# Patient Record
Sex: MALE | Race: WHITE | NOT HISPANIC OR LATINO
[De-identification: names, ages, dates, MRNs, and addresses within clinical notes are randomized per-mention and may not be internally consistent; named-entity substitution may affect disease eponyms.]

---

## 2017-04-18 ENCOUNTER — APPOINTMENT (OUTPATIENT)
Dept: VASCULAR SURGERY | Facility: CLINIC | Age: 57
End: 2017-04-18

## 2017-09-25 ENCOUNTER — EMERGENCY (EMERGENCY)
Facility: HOSPITAL | Age: 57
LOS: 1 days | Discharge: HOME | End: 2017-09-25
Attending: EMERGENCY MEDICINE | Admitting: EMERGENCY MEDICINE
Payer: COMMERCIAL

## 2017-09-25 VITALS
DIASTOLIC BLOOD PRESSURE: 84 MMHG | TEMPERATURE: 99 F | OXYGEN SATURATION: 99 % | RESPIRATION RATE: 16 BRPM | HEART RATE: 70 BPM | SYSTOLIC BLOOD PRESSURE: 146 MMHG

## 2017-09-25 DIAGNOSIS — Z98.89 OTHER SPECIFIED POSTPROCEDURAL STATES: Chronic | ICD-10-CM

## 2017-09-25 DIAGNOSIS — Z86.79 PERSONAL HISTORY OF OTHER DISEASES OF THE CIRCULATORY SYSTEM: Chronic | ICD-10-CM

## 2017-09-25 DIAGNOSIS — Z98.61 CORONARY ANGIOPLASTY STATUS: Chronic | ICD-10-CM

## 2017-09-25 PROCEDURE — 99285 EMERGENCY DEPT VISIT HI MDM: CPT

## 2017-09-25 NOTE — ED ADULT NURSE NOTE - OBJECTIVE STATEMENT
57 year old male A&OX3 presents with right leg swelling and fevers x several hours. Patient states he noticed leg swelling in the afternoon and was found to have a temperature of 101-102 orally. Patient states that leg swelling occurs occasionally. Patient has PMHX of stent placement x 2. Patient states that his family member gave patient antibiotics to take because patient believes he was bitten by a bug. Patient's lung sounds clear and equal bilaterally. Patient abdomen is distended but non tender to palpation. Patient denies nausea, vomiting, dizziness, weakness, shortness of breath, chest pain.

## 2017-09-26 VITALS
TEMPERATURE: 99 F | HEART RATE: 73 BPM | OXYGEN SATURATION: 99 % | RESPIRATION RATE: 18 BRPM | DIASTOLIC BLOOD PRESSURE: 80 MMHG | SYSTOLIC BLOOD PRESSURE: 136 MMHG

## 2017-09-26 LAB
ANION GAP SERPL CALC-SCNC: 15 MMOL/L — SIGNIFICANT CHANGE UP (ref 5–17)
BASOPHILS # BLD AUTO: 0 K/UL — SIGNIFICANT CHANGE UP (ref 0–0.2)
BASOPHILS NFR BLD AUTO: 0.3 % — SIGNIFICANT CHANGE UP (ref 0–2)
BUN SERPL-MCNC: 19 MG/DL — SIGNIFICANT CHANGE UP (ref 7–23)
CALCIUM SERPL-MCNC: 10 MG/DL — SIGNIFICANT CHANGE UP (ref 8.4–10.5)
CHLORIDE SERPL-SCNC: 99 MMOL/L — SIGNIFICANT CHANGE UP (ref 96–108)
CO2 SERPL-SCNC: 26 MMOL/L — SIGNIFICANT CHANGE UP (ref 22–31)
CREAT SERPL-MCNC: 0.92 MG/DL — SIGNIFICANT CHANGE UP (ref 0.5–1.3)
EOSINOPHIL # BLD AUTO: 0.1 K/UL — SIGNIFICANT CHANGE UP (ref 0–0.5)
EOSINOPHIL NFR BLD AUTO: 1 % — SIGNIFICANT CHANGE UP (ref 0–6)
GLUCOSE SERPL-MCNC: 103 MG/DL — HIGH (ref 70–99)
HCT VFR BLD CALC: 45.2 % — SIGNIFICANT CHANGE UP (ref 39–50)
HGB BLD-MCNC: 15.3 G/DL — SIGNIFICANT CHANGE UP (ref 13–17)
LYMPHOCYTES # BLD AUTO: 1.3 K/UL — SIGNIFICANT CHANGE UP (ref 1–3.3)
LYMPHOCYTES # BLD AUTO: 8.8 % — LOW (ref 13–44)
MCHC RBC-ENTMCNC: 32.9 PG — SIGNIFICANT CHANGE UP (ref 27–34)
MCHC RBC-ENTMCNC: 33.8 GM/DL — SIGNIFICANT CHANGE UP (ref 32–36)
MCV RBC AUTO: 97.5 FL — SIGNIFICANT CHANGE UP (ref 80–100)
MONOCYTES # BLD AUTO: 1.2 K/UL — HIGH (ref 0–0.9)
MONOCYTES NFR BLD AUTO: 8.7 % — SIGNIFICANT CHANGE UP (ref 2–14)
NEUTROPHILS # BLD AUTO: 11.7 K/UL — HIGH (ref 1.8–7.4)
NEUTROPHILS NFR BLD AUTO: 81.1 % — HIGH (ref 43–77)
PLATELET # BLD AUTO: 164 K/UL — SIGNIFICANT CHANGE UP (ref 150–400)
POTASSIUM SERPL-MCNC: 4 MMOL/L — SIGNIFICANT CHANGE UP (ref 3.5–5.3)
POTASSIUM SERPL-SCNC: 4 MMOL/L — SIGNIFICANT CHANGE UP (ref 3.5–5.3)
RBC # BLD: 4.64 M/UL — SIGNIFICANT CHANGE UP (ref 4.2–5.8)
RBC # FLD: 11.9 % — SIGNIFICANT CHANGE UP (ref 10.3–14.5)
SODIUM SERPL-SCNC: 140 MMOL/L — SIGNIFICANT CHANGE UP (ref 135–145)
WBC # BLD: 14.4 K/UL — HIGH (ref 3.8–10.5)
WBC # FLD AUTO: 14.4 K/UL — HIGH (ref 3.8–10.5)

## 2017-09-26 PROCEDURE — 93971 EXTREMITY STUDY: CPT | Mod: 26

## 2017-09-26 PROCEDURE — 87040 BLOOD CULTURE FOR BACTERIA: CPT

## 2017-09-26 PROCEDURE — 93971 EXTREMITY STUDY: CPT

## 2017-09-26 PROCEDURE — 85027 COMPLETE CBC AUTOMATED: CPT

## 2017-09-26 PROCEDURE — 80048 BASIC METABOLIC PNL TOTAL CA: CPT

## 2017-09-26 PROCEDURE — 99284 EMERGENCY DEPT VISIT MOD MDM: CPT | Mod: 25

## 2017-09-26 RX ORDER — CEPHALEXIN 500 MG
1 CAPSULE ORAL
Qty: 28 | Refills: 0
Start: 2017-09-26 | End: 2017-10-03

## 2017-09-26 RX ORDER — CEPHALEXIN 500 MG
500 CAPSULE ORAL ONCE
Refills: 0 | Status: COMPLETED | OUTPATIENT
Start: 2017-09-26 | End: 2017-09-26

## 2017-09-26 RX ADMIN — Medication 500 MILLIGRAM(S): at 02:41

## 2017-09-26 NOTE — ED PROVIDER NOTE - OBJECTIVE STATEMENT
Resident: 57y M PMH CAD s/p stents on aspirin, clopidogrel,, HTN, HLD, AAA s/p repair presents with fever, RLE swelling. This afternoon, patient felt cold, measured temperature Tmax 38. 5. Also noticed RLE swelling, redness. Took one dose of amoxicillin at 6pm from some he had left over. Denies trauma, fall. Hx vascular surgery on right leg.

## 2017-09-26 NOTE — ED PROVIDER NOTE - ATTENDING CONTRIBUTION TO CARE
pt with RLE swelling and redness. chills.   on exam, mild edema and redness.   cellulitis r/o dvt. will get US. labs, if reassuring, pt looks well, will give trial outpt abx tx with instructions to return if not improving..

## 2017-09-26 NOTE — ED PROVIDER NOTE - NS ED ROS FT
Constitutional: +fever, +chills.  Eyes: no visual changes.  ENMT: no sore throat.  CV: no chest pain.  Resp: no cough, no shortness of breath.  GI: no abdominal pain, no nausea, no vomiting, no diarrhea.  : no dysuria, no hematuria.  MSK: no back pain, no neck pain.  Skin: +rashes.  Neuro: no headache, no loss of consciousness, no weakness, no numbness, no tingling.  Psych: no known mental health issues.  Endo: no diabetes, no thyroid trouble.

## 2017-09-26 NOTE — ED PROVIDER NOTE - PROGRESS NOTE DETAILS
Resident: Labs show elevated WBCs, US negative for DVT. Likely cellulitis; will dc with abx and primary care doctor follow-up. -AV

## 2017-09-26 NOTE — ED PROVIDER NOTE - PHYSICAL EXAMINATION
Resident: Gen: well appearing, of stated age, no acute distress; Head: NC, AT; ENT: PERRL, EOMI, MMM, no uvular deviation, no tonsilar erythema; Neck: supple with full ROM; Chest: CTAB, no retractions, rate normal, appears to breathe comfortably; Heart: RRR S1S2 No JVD b/l pulses 2+ in arms and legs; Abd: Soft non-tender, no rebound or guarding; Back: No spinal deformity; Ext: Moving all 4 extremities without obvious impairment to ROM, no obvious weakness; Neuro: fluid speech; Psych: No anxiety, depression or pressured speech noted; Skin: right medial ankle with swelling, erythema, tenderness to palpation, no areas of fluctuence.

## 2017-10-01 LAB
CULTURE RESULTS: SIGNIFICANT CHANGE UP
CULTURE RESULTS: SIGNIFICANT CHANGE UP
SPECIMEN SOURCE: SIGNIFICANT CHANGE UP
SPECIMEN SOURCE: SIGNIFICANT CHANGE UP

## 2017-10-17 ENCOUNTER — APPOINTMENT (OUTPATIENT)
Dept: VASCULAR SURGERY | Facility: CLINIC | Age: 57
End: 2017-10-17
Payer: COMMERCIAL

## 2017-10-17 PROCEDURE — 93978 VASCULAR STUDY: CPT

## 2017-10-17 PROCEDURE — 99213 OFFICE O/P EST LOW 20 MIN: CPT | Mod: 25

## 2017-10-17 RX ORDER — AMLODIPINE BESYLATE 5 MG/1
5 TABLET ORAL
Qty: 30 | Refills: 0 | Status: ACTIVE | COMMUNITY
Start: 2017-04-03

## 2017-10-17 RX ORDER — CLOBETASOL PROPIONATE 0.5 MG/G
0.05 CREAM TOPICAL
Qty: 60 | Refills: 0 | Status: COMPLETED | COMMUNITY
Start: 2017-01-10

## 2017-10-17 RX ORDER — BUPROPION HYDROCHLORIDE 150 MG/1
150 TABLET, FILM COATED, EXTENDED RELEASE ORAL
Qty: 60 | Refills: 0 | Status: COMPLETED | COMMUNITY
Start: 2016-11-02

## 2017-10-17 RX ORDER — LEVOCETIRIZINE DIHYDROCHLORIDE 5 MG/1
5 TABLET ORAL
Qty: 30 | Refills: 0 | Status: COMPLETED | COMMUNITY
Start: 2017-03-01

## 2017-10-17 RX ORDER — CICLOPIROX OLAMINE 7.7 MG/G
0.77 CREAM TOPICAL
Qty: 60 | Refills: 0 | Status: COMPLETED | COMMUNITY
Start: 2017-01-10

## 2017-10-17 RX ORDER — FLUOCINONIDE 0.5 MG/G
0.05 CREAM TOPICAL
Qty: 60 | Refills: 0 | Status: COMPLETED | COMMUNITY
Start: 2017-03-01

## 2017-10-17 RX ORDER — KETOCONAZOLE 20.5 MG/ML
2 SHAMPOO, SUSPENSION TOPICAL
Qty: 120 | Refills: 0 | Status: COMPLETED | COMMUNITY
Start: 2017-03-01

## 2017-10-17 RX ORDER — HYDROXYZINE HYDROCHLORIDE 25 MG/1
25 TABLET ORAL
Qty: 30 | Refills: 0 | Status: COMPLETED | COMMUNITY
Start: 2017-01-10

## 2017-10-17 RX ORDER — AMMONIUM LACTATE 12 %
12 CREAM (GRAM) TOPICAL
Qty: 385 | Refills: 0 | Status: COMPLETED | COMMUNITY
Start: 2017-04-08

## 2017-10-17 RX ORDER — ASPIRIN ENTERIC COATED TABLETS 81 MG 81 MG/1
81 TABLET, DELAYED RELEASE ORAL
Qty: 30 | Refills: 0 | Status: COMPLETED | COMMUNITY
Start: 2016-11-02

## 2018-10-09 ENCOUNTER — APPOINTMENT (OUTPATIENT)
Dept: VASCULAR SURGERY | Facility: CLINIC | Age: 58
End: 2018-10-09

## 2018-10-23 ENCOUNTER — APPOINTMENT (OUTPATIENT)
Dept: VASCULAR SURGERY | Facility: CLINIC | Age: 58
End: 2018-10-23
Payer: COMMERCIAL

## 2018-10-23 PROCEDURE — 93978 VASCULAR STUDY: CPT

## 2018-10-23 PROCEDURE — 99213 OFFICE O/P EST LOW 20 MIN: CPT

## 2019-10-22 ENCOUNTER — APPOINTMENT (OUTPATIENT)
Dept: VASCULAR SURGERY | Facility: CLINIC | Age: 59
End: 2019-10-22
Payer: COMMERCIAL

## 2019-10-22 PROCEDURE — 99213 OFFICE O/P EST LOW 20 MIN: CPT

## 2019-10-22 PROCEDURE — 93978 VASCULAR STUDY: CPT

## 2019-10-29 ENCOUNTER — APPOINTMENT (OUTPATIENT)
Dept: ORTHOPEDIC SURGERY | Facility: CLINIC | Age: 59
End: 2019-10-29

## 2019-10-29 DIAGNOSIS — M25.562 PAIN IN LEFT KNEE: ICD-10-CM

## 2019-10-29 DIAGNOSIS — G89.29 PAIN IN LEFT KNEE: ICD-10-CM

## 2019-10-29 PROBLEM — Z00.00 ENCOUNTER FOR PREVENTIVE HEALTH EXAMINATION: Noted: 2019-10-29

## 2019-10-29 NOTE — PROCEDURE
[FreeTextEntry1] : Aortic duplex reveals widely patent endograft w/o signs of endoleak, and sac measuring 4.9x4.8cm

## 2019-10-29 NOTE — ASSESSMENT
[FreeTextEntry1] : Pt s/p EVAR/AAA 6mos prior w/o symptoms and widely patent endograft on duplex w/o signs of endoleak.  Aneurysm sac alec from pre-op size measurements (4.6x4.9cm-->4.9x4.8cm).  Will reimage patient's endograft next year w/CTA a/p.\par \par I personally discussed this patient with the Physician Assistant at the time of the visit. I agree with the assessment and plan as written

## 2019-10-29 NOTE — PHYSICAL EXAM
[Normal Thyroid] : the thyroid was normal [Normal Breath Sounds] : Normal breath sounds [Normal Rate and Rhythm] : normal rate and rhythm [2+] : left 2+ [No HSM] : no hepatosplenomegaly [No Rash or Lesion] : No rash or lesion [Alert] : alert [Calm] : calm [JVD] : no jugular venous distention  [Ankle Swelling (On Exam)] : not present [Abdomen Masses] : No abdominal masses [Abdomen Tenderness] : ~T ~M No abdominal tenderness [Purpura] : no purpura  [Petechiae] : no petechiae [Skin Ulcer] : no ulcer [Skin Induration] : no induration [de-identified] : Well-nourished, NAD [de-identified] : NC/AT, anicteric [de-identified] : SNTND, +BSx4 [de-identified] : FROM throughout, strength 5x5 x 4

## 2019-10-29 NOTE — HISTORY OF PRESENT ILLNESS
[FreeTextEntry1] : 57yoM w/h/o smoking and AAA s/p EVAR/AAA 6mos prior returning for surveillance duplex.  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

## 2019-11-04 ENCOUNTER — APPOINTMENT (OUTPATIENT)
Dept: ORTHOPEDIC SURGERY | Facility: CLINIC | Age: 59
End: 2019-11-04

## 2019-11-12 ENCOUNTER — APPOINTMENT (OUTPATIENT)
Dept: SPORTS MEDICINE | Facility: CLINIC | Age: 59
End: 2019-11-12
Payer: COMMERCIAL

## 2019-11-12 DIAGNOSIS — E78.00 PURE HYPERCHOLESTEROLEMIA, UNSPECIFIED: ICD-10-CM

## 2019-11-12 DIAGNOSIS — I10 ESSENTIAL (PRIMARY) HYPERTENSION: ICD-10-CM

## 2019-11-12 DIAGNOSIS — I51.9 HEART DISEASE, UNSPECIFIED: ICD-10-CM

## 2019-11-12 DIAGNOSIS — M70.52 OTHER BURSITIS OF KNEE, LEFT KNEE: ICD-10-CM

## 2019-11-12 PROCEDURE — 99204 OFFICE O/P NEW MOD 45 MIN: CPT | Mod: 25

## 2019-11-12 PROCEDURE — 20605 DRAIN/INJ JOINT/BURSA W/O US: CPT

## 2019-11-12 RX ORDER — METOPROLOL SUCCINATE 100 MG/1
TABLET, EXTENDED RELEASE ORAL
Refills: 0 | Status: ACTIVE | COMMUNITY

## 2019-11-12 RX ORDER — OMEPRAZOLE 20 MG/1
20 CAPSULE, DELAYED RELEASE ORAL
Refills: 0 | Status: ACTIVE | COMMUNITY

## 2019-11-12 NOTE — PROCEDURE
[Aspiration] : Aspiration [Left] : of the left [Effusion] : Effusion [Patient] : patient [Prepatellar Bursa] : prepatellar bursa [Benefits] : benefits [Risk] : risk [Verbal Consent Obtained] : verbal consent was obtained prior to the procedure [Alcohol] : Alcohol [Infection] : infection [Bleeding] : bleeding [___mL] : [unfilled] ~UmL of lidocaine [Betadine] : Betadine [18] : an 18-gauge [Lateral] : lateral [Ultrasound Guided] : The procedure was ultrasound guided.   [Bloody] : bloody [Bandage Applied] : a bandage [___ mL Fluid] : [unfilled] mL of [Ace Wrap] : an ace wrap [None] : none [Tolerated Well] : The patient tolerated the procedure well [de-identified] : Attending note. Using aseptic technique. Skin and needle track were anesthetized with 1% plain lidocaine. 48 cc of bloody synovial fluid was aspirated from the prepatellar bursa. Procedure was tolerated well. Ace bandage was applied to the knee.

## 2019-11-12 NOTE — PHYSICAL EXAM
[Normal] : No respiratory distress and lungs were clear to auscultation bilaterally [de-identified] : Patient with patellar swelling. There is no knee joint effusion noted. There is no overlying erythema or tenderness to palpation. There is no joint line tenderness. Vidal's was negative. He has full ROM. Lachman's negative. Anterior drawer and posterior drawer tests were negative. MCL and LCL appear intact with no increased ligamentous laxity appreciated. There is no palpable Baker's cyst. He has a markedly swollen patellar bursa. \par     Attending note. Patient is alert and in no acute distress. Examination of the left lower extremity reveals swelling over the prepatellar bursa. There is no leg edema or calf tenderness. There is no tenderness over the prepatellar bursa. There is no knee effusion. Patient has full range of motion of his knee. He is stable when stressed. There is no joint line tenderness. Skin is normal. Sensation is normal. His pulses are intact. [de-identified] : US of the left knee was performed. There is a markedly enlarged patellar bursa noted. No knee joint effusion appreciated. Quadriceps tendon, Patellar tendon, MCL, LCL are intact. No Baker's cyst.

## 2019-11-12 NOTE — DISCUSSION/SUMMARY
[de-identified] : Patient is a 59 year old presenting with left prepatellar swelling. 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 Aspirate was 50mL and bloody, likely a traumatic bursitis. He tolerated aspiration without issue and knee was wrapped in ACE bandage. Advised patient to keep compression on the knee for the next 3 days and try to avoid kneeling. Will return to the clinic if he notes recurrent swelling. \par    Attending note. Impression: #1 left anterior knee swelling, #2 traumatic prepatellar bursitis of the left knee. Approximately 48 cc of bloody synovial fluid was aspirated from the left knee today. Ace bandage was applied. Patient and family member were advised that the swelling may recur. The patient returns for recurrent prepatellar bursitis would aspirate and consider steroid.

## 2019-11-12 NOTE — HISTORY OF PRESENT ILLNESS
[de-identified] : Patient is a 59 year old with history of heart disease, HLD, PVD who presents with left knee swelling. Patient states that 2 weeks ago, he noticed that his left knee was swollen after a day at work. He did not have any pain with the swelling. He did not recall any trauma in the left knee that caused his pain. He states that there is some discomfort with ambulation, but he does not find it extremely uncomfortable. He has not used any medications for this issue. Patient works in [employer] often and carries heavy loads.\par     Attending note. 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 Patient complains of traumatic left anterior knee swelling without pain. Denies any other joint pain or swelling. Patient takes a baby aspirin daily. Symptoms have not worsened over the last 2 weeks when it started gradually.

## 2019-11-12 NOTE — REVIEW OF SYSTEMS
[Negative] : Neurological [Arthralgia] : no arthralgia [Joint Pain] : no joint pain [Joint Stiffness] : no joint stiffness [Joint Swelling] : joint swelling [FreeTextEntry9] : left knee swelling

## 2019-11-19 ENCOUNTER — APPOINTMENT (OUTPATIENT)
Age: 59
End: 2019-11-19
Payer: COMMERCIAL

## 2019-11-19 DIAGNOSIS — M70.40 PREPATELLAR BURSITIS, UNSPECIFIED KNEE: ICD-10-CM

## 2019-11-19 PROCEDURE — 20605 DRAIN/INJ JOINT/BURSA W/O US: CPT

## 2019-11-19 PROCEDURE — 99213 OFFICE O/P EST LOW 20 MIN: CPT | Mod: 25

## 2019-11-19 NOTE — HISTORY OF PRESENT ILLNESS
[de-identified] : Patient returning due to swelling of the left kneecap. Patient states that the fluid re-accumulated the day after it was drained in the office one week prior. He states that he has been using compression wrapping without improvement in the swelling. He does not have any pain, redness, or erythema of the site. Patient has been avoiding kneeling and has not had any trauma to the knees. He has not had any fevers or chills. \par      Attending note. This is a follow up visit for this 59-year-old male with left prepatellar bursitis. Patient had aspiration of 48 cc of serosanguineous fluid last week. He reports having immediate recurrence. He denies any pain, fevers or chills or erythema.

## 2019-11-19 NOTE — PHYSICAL EXAM
[Normal] : No respiratory distress and lungs were clear to auscultation bilaterally [de-identified] : Patient with pre-patellar swelling, slightly improved from previous week . There is no knee joint effusion noted. There is no overlying erythema or tenderness to palpation. There is no joint line tenderness. Vidal's was negative. He has full ROM. Lachman's negative. Anterior drawer and posterior drawer tests were negative. MCL and LCL appear intact with no increased ligamentous laxity appreciated. There is no palpable Baker's cyst. \par    Attending note. he is alert and in no acute distress. Examination of the left lower extremities reveals a large prepatellar area of swelling. There is no tenderness. There is no erythema. There is no increased heat. There is no suprapatellar effusion. Skin is intact and normal.

## 2019-11-19 NOTE — DISCUSSION/SUMMARY
[de-identified] : Patient is a 59 year old presenting with left prepatellar swelling. 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 Aspirate was 34mL and bloody, likely a traumatic bursitis. He tolerated aspiration without issue and knee was wrapped in ACE bandage. Advised patient to keep compression on the knee for the next 3 days and try to avoid kneeling. Steroid was injected this visit to hopefully treat inflammatory process causing his swelling. He will return as needed for pre-patellar swelling. May benefit from prolotherapy next visit if recurrent. \par     Attending note. Impression: #1 left prepatellar bursitis. Patient had second aspiration of prepatellar bursitis today removing 34 cc of serosanguineous fluid. 1 cc of 40 mg of methylprednisolone +1 cc of plain lidocaine were injected into the prepatellar bursa. Patient was advised that he may still have recurrence of the prepatellar bursitis which may require additional aspirations.

## 2019-11-19 NOTE — PROCEDURE
[Aspiration] : Aspiration [Injection] : Injection [Left] : of the left [Effusion] : Effusion [Prepatellar Bursa] : prepatellar bursa [Patient] : patient [Inflammation] : Inflammation [Bleeding] : bleeding [Risk] : risk [Infection] : infection [Alcohol] : Alcohol [Betadine] : Betadine [Ethyl Chloride Spray] : ethyl chloride spray was used as a topical anesthetic [___mL] : [unfilled] ~UmL of lidocaine [1%] : 1% lidocaine [18] : an 18-gauge [Medial] : medial [Bloody] : bloody [Same Needle/New Syringe] : the syringe was changed and the same needle was left in place and [1% Lidocaine___(mL)] : [unfilled] mL of 1% Lidocaine [Methylpred. 40mg/mL___(mL)] : [unfilled] mL of 40mg/mL methylprednisolone [Bandage Applied] : a bandage [Tolerated Well] : The patient tolerated the procedure well [Ace Wrap] : an ace wrap [None] : none [___ mL Fluid] : [unfilled] mL of [de-identified] : Attending note.  Using aseptic the prepatellar bursitis of the left knee was injected at all on the needle track with 1% plain lidocaine. An 18-gauge needle was used to aspirate 34 cc of serosanguineous fluid. 1cc of 40 mgs of methylprednisolone + 1cc of 1% plain lidocaine was injected into the prepatellar bursa. Knee was then wrapped and an Ace bandage. Procedure was tolerated well.

## 2020-10-20 ENCOUNTER — RESULT REVIEW (OUTPATIENT)
Age: 60
End: 2020-10-20

## 2020-10-20 ENCOUNTER — APPOINTMENT (OUTPATIENT)
Dept: CT IMAGING | Facility: HOSPITAL | Age: 60
End: 2020-10-20
Payer: COMMERCIAL

## 2020-10-20 ENCOUNTER — OUTPATIENT (OUTPATIENT)
Dept: OUTPATIENT SERVICES | Facility: HOSPITAL | Age: 60
LOS: 1 days | End: 2020-10-20
Payer: COMMERCIAL

## 2020-10-20 ENCOUNTER — APPOINTMENT (OUTPATIENT)
Dept: VASCULAR SURGERY | Facility: CLINIC | Age: 60
End: 2020-10-20
Payer: COMMERCIAL

## 2020-10-20 DIAGNOSIS — Z98.89 OTHER SPECIFIED POSTPROCEDURAL STATES: Chronic | ICD-10-CM

## 2020-10-20 DIAGNOSIS — Z86.79 PERSONAL HISTORY OF OTHER DISEASES OF THE CIRCULATORY SYSTEM: Chronic | ICD-10-CM

## 2020-10-20 DIAGNOSIS — Z98.61 CORONARY ANGIOPLASTY STATUS: Chronic | ICD-10-CM

## 2020-10-20 PROCEDURE — 74174 CTA ABD&PLVS W/CONTRAST: CPT

## 2020-10-20 PROCEDURE — 74174 CTA ABD&PLVS W/CONTRAST: CPT | Mod: 26

## 2020-10-20 PROCEDURE — 99072 ADDL SUPL MATRL&STAF TM PHE: CPT

## 2020-10-20 PROCEDURE — 99213 OFFICE O/P EST LOW 20 MIN: CPT

## 2020-10-26 NOTE — ASSESSMENT
[FreeTextEntry1] : 60yoM w/h/o smoking and AAA s/p EVAR/AAA 6mos prior returning for surveillance; pt underwent CTA a/p this AM at [facility].  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  Will reimage pt w/duplex in 6mos for surveillance.\par \par I personally discussed this patient with the Physician Assistant at the time of the visit. I agree with the assessment and plan as written

## 2020-10-26 NOTE — HISTORY OF PRESENT ILLNESS
[FreeTextEntry1] : 60yoM w/h/o smoking and AAA s/p EVAR/AAA 6mos prior returning for surveillance; pt underwent CTA a/p this AM at [facility].  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

## 2020-10-26 NOTE — PROCEDURE
[FreeTextEntry1] : CTA a/p today reveals widely patent endograft w/o signs of endoleak, and sac measuring 5.4x5.4cm

## 2020-10-26 NOTE — PHYSICAL EXAM
[Normal Thyroid] : the thyroid was normal [Normal Breath Sounds] : Normal breath sounds [Normal Rate and Rhythm] : normal rate and rhythm [2+] : left 2+ [No HSM] : no hepatosplenomegaly [No Rash or Lesion] : No rash or lesion [Alert] : alert [Calm] : calm [JVD] : no jugular venous distention  [Ankle Swelling (On Exam)] : not present [Abdomen Masses] : No abdominal masses [Abdomen Tenderness] : ~T ~M No abdominal tenderness [Purpura] : no purpura  [Petechiae] : no petechiae [Skin Ulcer] : no ulcer [Skin Induration] : no induration [de-identified] : Well-nourished, NAD [de-identified] : NC/AT, anicteric [de-identified] : SNTND, +BSx4 [de-identified] : FROM throughout, strength 5x5 x 4

## 2021-04-20 ENCOUNTER — APPOINTMENT (OUTPATIENT)
Dept: VASCULAR SURGERY | Facility: CLINIC | Age: 61
End: 2021-04-20
Payer: COMMERCIAL

## 2021-05-25 ENCOUNTER — APPOINTMENT (OUTPATIENT)
Dept: VASCULAR SURGERY | Facility: CLINIC | Age: 61
End: 2021-05-25
Payer: COMMERCIAL

## 2021-05-25 DIAGNOSIS — I71.4 ABDOMINAL AORTIC ANEURYSM, W/OUT RUPTURE: ICD-10-CM

## 2021-05-25 PROCEDURE — 99072 ADDL SUPL MATRL&STAF TM PHE: CPT

## 2021-05-25 PROCEDURE — 93971 EXTREMITY STUDY: CPT

## 2021-05-25 PROCEDURE — 99213 OFFICE O/P EST LOW 20 MIN: CPT

## 2021-05-25 PROCEDURE — 93978 VASCULAR STUDY: CPT

## 2021-05-28 NOTE — PROCEDURE
[FreeTextEntry1] : Aortic duplex performed today to assess for graft patency and aneurysm sac growth reveals widely patent aortic endograft, aneurysm sac measures 4.9x4.7cm.\par \par RLE venous duplex performed to assess pt's RLE edema reveals no evidence of SVT/DVT or venous compression

## 2021-05-28 NOTE — ADDENDUM
[FreeTextEntry1] : This note was written by Mando Craft, acting as a scribe for Dr. Michele Arnold.  I, Dr. Michele Arnold, have read and attest that all the information, medical decision-making, and discharge instructions within are true and accurate.\par \par I, Dr. Michele Arnold, personally performed the evaluation and management (E/M) services for this established patient who presents today with (an) existing condition(s).  That E/M includes conducting the examination, assessing all conditions, and (re)establishing/reinforcing a plan of care.  Today, my ACP, Mando Craft, was here to observe my evaluation and management services for this condition to be followed going forward.

## 2021-05-28 NOTE — ASSESSMENT
[FreeTextEntry1] : 61yoM w/h/o smoking and AAA s/p EVAR/AAA 6mos prior returning for surveillance.  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  RLE venous duplex performed to assess pt's RLE edema reveals no evidence of SVT/DVT or venous compression.  Will reimage pt w/duplex in 1y for surveillance.\par \par I, Dr. Arnold, personally performed the evaluation and management (E/M) services for this established patient who presents today with (a) new problem(s)/exacerbation of (an) existing condition(s).  That E/M includes conducting the examination, assessing all new/exacerbated conditions, and establishing a new plan of care.  Today, ACP, Mando Albrecht, was here to observe my evaluation and management services for this new problem/exacerbated condition to be followed going forward.

## 2021-05-28 NOTE — PHYSICAL EXAM
[JVD] : no jugular venous distention  [Ankle Swelling (On Exam)] : not present [Abdomen Masses] : No abdominal masses [Abdomen Tenderness] : ~T ~M No abdominal tenderness [Purpura] : no purpura  [Petechiae] : no petechiae [Skin Ulcer] : no ulcer [Skin Induration] : no induration [de-identified] : NC/AT, anicteric [de-identified] : Well-nourished, NAD [de-identified] : SNTND, +BSx4 [de-identified] : FROM throughout, strength 5x5 x 4

## 2021-05-28 NOTE — HISTORY OF PRESENT ILLNESS
[FreeTextEntry1] : 61yoM w/h/o smoking and AAA s/p EVAR/AAA 6mos prior returning for surveillance.  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  Previous CTA a/p revealed widely patent endograft w/o signs of endoleak, and sac measured 5.4x5.4cm.  Pt also reports chronic RLE edema that worsens while he is at work, but improves w/elevation and rest.

## 2021-08-23 ENCOUNTER — TRANSCRIPTION ENCOUNTER (OUTPATIENT)
Age: 61
End: 2021-08-23

## 2021-08-31 ENCOUNTER — TRANSCRIPTION ENCOUNTER (OUTPATIENT)
Age: 61
End: 2021-08-31

## 2022-05-10 ENCOUNTER — NON-APPOINTMENT (OUTPATIENT)
Age: 62
End: 2022-05-10

## 2022-05-17 ENCOUNTER — NON-APPOINTMENT (OUTPATIENT)
Age: 62
End: 2022-05-17

## 2022-06-12 ENCOUNTER — NON-APPOINTMENT (OUTPATIENT)
Age: 62
End: 2022-06-12

## 2022-06-16 ENCOUNTER — NON-APPOINTMENT (OUTPATIENT)
Age: 62
End: 2022-06-16